# Patient Record
Sex: MALE | Race: WHITE | NOT HISPANIC OR LATINO | Employment: FULL TIME | URBAN - METROPOLITAN AREA
[De-identification: names, ages, dates, MRNs, and addresses within clinical notes are randomized per-mention and may not be internally consistent; named-entity substitution may affect disease eponyms.]

---

## 2017-08-15 ENCOUNTER — CLINICAL SUPPORT (OUTPATIENT)
Dept: SMOKING CESSATION | Facility: CLINIC | Age: 47
End: 2017-08-15
Payer: COMMERCIAL

## 2017-08-15 VITALS — WEIGHT: 196 LBS | HEIGHT: 73 IN | BODY MASS INDEX: 25.98 KG/M2

## 2017-08-15 DIAGNOSIS — F17.200 TOBACCO USE DISORDER: Primary | ICD-10-CM

## 2017-08-15 PROCEDURE — 99404 PREV MED CNSL INDIV APPRX 60: CPT | Mod: S$GLB,,,

## 2017-08-15 PROCEDURE — 99999 PR PBB SHADOW E&M-NEW PATIENT-LVL II: CPT | Mod: PBBFAC,,,

## 2017-08-15 RX ORDER — IBUPROFEN 200 MG
1 TABLET ORAL DAILY
Qty: 14 PATCH | Refills: 0 | Status: SHIPPED | OUTPATIENT
Start: 2017-08-15 | End: 2017-08-29

## 2017-08-15 RX ORDER — BUPROPION HYDROCHLORIDE 150 MG/1
150 TABLET, EXTENDED RELEASE ORAL 2 TIMES DAILY
Qty: 60 TABLET | Refills: 0 | Status: SHIPPED | OUTPATIENT
Start: 2017-08-15 | End: 2018-08-15

## 2017-09-05 ENCOUNTER — TELEPHONE (OUTPATIENT)
Dept: SMOKING CESSATION | Facility: CLINIC | Age: 47
End: 2017-09-05

## 2018-05-03 ENCOUNTER — CLINICAL SUPPORT (OUTPATIENT)
Dept: SMOKING CESSATION | Facility: CLINIC | Age: 48
End: 2018-05-03
Payer: COMMERCIAL

## 2018-05-03 ENCOUNTER — TELEPHONE (OUTPATIENT)
Dept: SMOKING CESSATION | Facility: CLINIC | Age: 48
End: 2018-05-03

## 2018-05-03 DIAGNOSIS — F17.200 NICOTINE DEPENDENCE: Primary | ICD-10-CM

## 2018-05-03 PROCEDURE — 99407 BEHAV CHNG SMOKING > 10 MIN: CPT | Mod: S$GLB,,,

## 2018-05-03 NOTE — PROGRESS NOTES
Pt not ready to return to the program at this time. States, he had a recent death in the family and will call back at a later date. Pt provided with his benefit status and contact information to schedule an appointment when ready

## 2018-07-20 ENCOUNTER — CLINICAL SUPPORT (OUTPATIENT)
Dept: SMOKING CESSATION | Facility: CLINIC | Age: 48
End: 2018-07-20
Payer: COMMERCIAL

## 2018-07-20 DIAGNOSIS — F17.200 NICOTINE DEPENDENCE: Primary | ICD-10-CM

## 2018-07-20 PROCEDURE — 99407 BEHAV CHNG SMOKING > 10 MIN: CPT | Mod: S$GLB,,,

## 2018-07-20 NOTE — PROGRESS NOTES
Successful contact with patient regarding tobacco cessation quit #1. Pt states, he was unable to become tobacco free, he continue to smoke occasionally, the prescribe tobacco cessation medication Wellbutrin was not helpful, and he will return to the program at a later date. Pt provided with his current benefit status and contact information to schedule an appointment when he is ready. Tobacco cessation smart form updated and the episode is resolved.

## 2021-03-30 ENCOUNTER — IMMUNIZATION (OUTPATIENT)
Dept: PRIMARY CARE CLINIC | Facility: CLINIC | Age: 51
End: 2021-03-30

## 2021-03-30 DIAGNOSIS — Z23 NEED FOR VACCINATION: Primary | ICD-10-CM

## 2021-03-30 PROCEDURE — 0001A COVID-19, MRNA, LNP-S, PF, 30 MCG/0.3 ML DOSE VACCINE: CPT | Mod: CV19,S$GLB,, | Performed by: INTERNAL MEDICINE

## 2021-03-30 PROCEDURE — 0001A COVID-19, MRNA, LNP-S, PF, 30 MCG/0.3 ML DOSE VACCINE: ICD-10-PCS | Mod: CV19,S$GLB,, | Performed by: INTERNAL MEDICINE

## 2021-03-30 PROCEDURE — 91300 COVID-19, MRNA, LNP-S, PF, 30 MCG/0.3 ML DOSE VACCINE: ICD-10-PCS | Mod: S$GLB,,, | Performed by: INTERNAL MEDICINE

## 2021-03-30 PROCEDURE — 91300 COVID-19, MRNA, LNP-S, PF, 30 MCG/0.3 ML DOSE VACCINE: CPT | Mod: S$GLB,,, | Performed by: INTERNAL MEDICINE

## 2021-04-20 ENCOUNTER — IMMUNIZATION (OUTPATIENT)
Dept: PRIMARY CARE CLINIC | Facility: CLINIC | Age: 51
End: 2021-04-20
Payer: COMMERCIAL

## 2021-04-20 DIAGNOSIS — Z23 NEED FOR VACCINATION: Primary | ICD-10-CM

## 2021-04-20 PROCEDURE — 91300 COVID-19, MRNA, LNP-S, PF, 30 MCG/0.3 ML DOSE VACCINE: ICD-10-PCS | Mod: S$GLB,,, | Performed by: INTERNAL MEDICINE

## 2021-04-20 PROCEDURE — 0002A COVID-19, MRNA, LNP-S, PF, 30 MCG/0.3 ML DOSE VACCINE: CPT | Mod: CV19,S$GLB,, | Performed by: INTERNAL MEDICINE

## 2021-04-20 PROCEDURE — 0002A COVID-19, MRNA, LNP-S, PF, 30 MCG/0.3 ML DOSE VACCINE: ICD-10-PCS | Mod: CV19,S$GLB,, | Performed by: INTERNAL MEDICINE

## 2021-04-20 PROCEDURE — 91300 COVID-19, MRNA, LNP-S, PF, 30 MCG/0.3 ML DOSE VACCINE: CPT | Mod: S$GLB,,, | Performed by: INTERNAL MEDICINE

## 2022-06-06 ENCOUNTER — NURSE TRIAGE (OUTPATIENT)
Dept: ADMINISTRATIVE | Facility: CLINIC | Age: 52
End: 2022-06-06
Payer: COMMERCIAL

## 2022-06-06 NOTE — TELEPHONE ENCOUNTER
Pt calling stating his symptoms started last Saturday and he tested positive for COVID last Monday so today is day 10 from symptoms. Asking if he needs to retest and when to end isolation. Advised per CDC and [facility]. verbalized understanding. Advised pt to call back with any other concerns or worsening symptoms. Verbalized understanding.    Reason for Disposition   COVID-19 Home Isolation, questions about   COVID-19 Testing, questions about    Protocols used: CORONAVIRUS (COVID-19) DIAGNOSED OR [id number]

## 2023-12-16 ENCOUNTER — HOSPITAL ENCOUNTER (EMERGENCY)
Facility: HOSPITAL | Age: 53
Discharge: HOME | End: 2023-12-16
Attending: EMERGENCY MEDICINE
Payer: COMMERCIAL

## 2023-12-16 VITALS
BODY MASS INDEX: 26.73 KG/M2 | DIASTOLIC BLOOD PRESSURE: 92 MMHG | SYSTOLIC BLOOD PRESSURE: 146 MMHG | HEART RATE: 87 BPM | HEIGHT: 75 IN | RESPIRATION RATE: 17 BRPM | OXYGEN SATURATION: 99 % | WEIGHT: 215 LBS | TEMPERATURE: 99 F

## 2023-12-16 DIAGNOSIS — K57.92 ACUTE DIVERTICULITIS OF INTESTINE: Primary | ICD-10-CM

## 2023-12-16 LAB
ALBUMIN SERPL BCP-MCNC: 4 G/DL (ref 3.5–5.2)
ALP SERPL-CCNC: 82 U/L (ref 55–135)
ALT SERPL W/O P-5'-P-CCNC: 12 U/L (ref 10–44)
ANION GAP SERPL CALC-SCNC: 12 MMOL/L (ref 8–16)
AST SERPL-CCNC: 23 U/L (ref 10–40)
BASOPHILS # BLD AUTO: 0.09 K/UL (ref 0–0.2)
BASOPHILS NFR BLD: 0.7 % (ref 0–1.9)
BILIRUB SERPL-MCNC: 0.4 MG/DL (ref 0.1–1)
BILIRUB UR QL STRIP: NEGATIVE
BUN SERPL-MCNC: 11 MG/DL (ref 6–20)
CALCIUM SERPL-MCNC: 9.1 MG/DL (ref 8.7–10.5)
CHLORIDE SERPL-SCNC: 100 MMOL/L (ref 95–110)
CLARITY UR: CLEAR
CO2 SERPL-SCNC: 23 MMOL/L (ref 23–29)
COLOR UR: YELLOW
CREAT SERPL-MCNC: 1 MG/DL (ref 0.5–1.4)
DIFFERENTIAL METHOD: ABNORMAL
EOSINOPHIL # BLD AUTO: 0.3 K/UL (ref 0–0.5)
EOSINOPHIL NFR BLD: 1.9 % (ref 0–8)
ERYTHROCYTE [DISTWIDTH] IN BLOOD BY AUTOMATED COUNT: 16.7 % (ref 11.5–14.5)
EST. GFR  (NO RACE VARIABLE): >60 ML/MIN/1.73 M^2
GLUCOSE SERPL-MCNC: 188 MG/DL (ref 70–110)
GLUCOSE UR QL STRIP: NEGATIVE
HCT VFR BLD AUTO: 44.3 % (ref 40–54)
HGB BLD-MCNC: 14.8 G/DL (ref 14–18)
HGB UR QL STRIP: NEGATIVE
IMM GRANULOCYTES # BLD AUTO: 0.05 K/UL (ref 0–0.04)
IMM GRANULOCYTES NFR BLD AUTO: 0.4 % (ref 0–0.5)
KETONES UR QL STRIP: ABNORMAL
LACTATE SERPL-SCNC: 0.8 MMOL/L (ref 0.5–2.2)
LEUKOCYTE ESTERASE UR QL STRIP: NEGATIVE
LIPASE SERPL-CCNC: 63 U/L (ref 4–60)
LYMPHOCYTES # BLD AUTO: 2.5 K/UL (ref 1–4.8)
LYMPHOCYTES NFR BLD: 18.1 % (ref 18–48)
MCH RBC QN AUTO: 27.1 PG (ref 27–31)
MCHC RBC AUTO-ENTMCNC: 33.4 G/DL (ref 32–36)
MCV RBC AUTO: 81 FL (ref 82–98)
MONOCYTES # BLD AUTO: 1.1 K/UL (ref 0.3–1)
MONOCYTES NFR BLD: 8 % (ref 4–15)
NEUTROPHILS # BLD AUTO: 9.6 K/UL (ref 1.8–7.7)
NEUTROPHILS NFR BLD: 70.9 % (ref 38–73)
NITRITE UR QL STRIP: NEGATIVE
NRBC BLD-RTO: 0 /100 WBC
PH UR STRIP: 7 [PH] (ref 5–8)
PLATELET # BLD AUTO: 211 K/UL (ref 150–450)
PMV BLD AUTO: 10.5 FL (ref 9.2–12.9)
POTASSIUM SERPL-SCNC: 3.6 MMOL/L (ref 3.5–5.1)
PROT SERPL-MCNC: 7.4 G/DL (ref 6–8.4)
PROT UR QL STRIP: NEGATIVE
RBC # BLD AUTO: 5.47 M/UL (ref 4.6–6.2)
SODIUM SERPL-SCNC: 135 MMOL/L (ref 136–145)
SP GR UR STRIP: 1.02 (ref 1–1.03)
URN SPEC COLLECT METH UR: ABNORMAL
UROBILINOGEN UR STRIP-ACNC: NEGATIVE EU/DL
WBC # BLD AUTO: 13.57 K/UL (ref 3.9–12.7)

## 2023-12-16 PROCEDURE — 63600175 PHARM REV CODE 636 W HCPCS: Performed by: EMERGENCY MEDICINE

## 2023-12-16 PROCEDURE — 99285 EMERGENCY DEPT VISIT HI MDM: CPT | Mod: 25

## 2023-12-16 PROCEDURE — 96365 THER/PROPH/DIAG IV INF INIT: CPT

## 2023-12-16 PROCEDURE — 85025 COMPLETE CBC W/AUTO DIFF WBC: CPT | Performed by: EMERGENCY MEDICINE

## 2023-12-16 PROCEDURE — 83605 ASSAY OF LACTIC ACID: CPT | Performed by: EMERGENCY MEDICINE

## 2023-12-16 PROCEDURE — 96361 HYDRATE IV INFUSION ADD-ON: CPT

## 2023-12-16 PROCEDURE — 80053 COMPREHEN METABOLIC PANEL: CPT | Performed by: EMERGENCY MEDICINE

## 2023-12-16 PROCEDURE — 25500020 PHARM REV CODE 255: Performed by: EMERGENCY MEDICINE

## 2023-12-16 PROCEDURE — 25000003 PHARM REV CODE 250: Performed by: EMERGENCY MEDICINE

## 2023-12-16 PROCEDURE — 81003 URINALYSIS AUTO W/O SCOPE: CPT | Performed by: EMERGENCY MEDICINE

## 2023-12-16 PROCEDURE — 96375 TX/PRO/DX INJ NEW DRUG ADDON: CPT

## 2023-12-16 PROCEDURE — 87040 BLOOD CULTURE FOR BACTERIA: CPT | Mod: 59 | Performed by: EMERGENCY MEDICINE

## 2023-12-16 PROCEDURE — 83690 ASSAY OF LIPASE: CPT | Performed by: EMERGENCY MEDICINE

## 2023-12-16 RX ORDER — ACETAMINOPHEN 500 MG
1000 TABLET ORAL EVERY 6 HOURS PRN
Qty: 20 TABLET | Refills: 0 | Status: SHIPPED | OUTPATIENT
Start: 2023-12-16

## 2023-12-16 RX ORDER — AMOXICILLIN AND CLAVULANATE POTASSIUM 875; 125 MG/1; MG/1
1 TABLET, FILM COATED ORAL 2 TIMES DAILY
Qty: 14 TABLET | Refills: 0 | Status: SHIPPED | OUTPATIENT
Start: 2023-12-16

## 2023-12-16 RX ORDER — ONDANSETRON 2 MG/ML
8 INJECTION INTRAMUSCULAR; INTRAVENOUS
Status: COMPLETED | OUTPATIENT
Start: 2023-12-16 | End: 2023-12-16

## 2023-12-16 RX ORDER — ONDANSETRON 4 MG/1
4 TABLET, FILM COATED ORAL EVERY 6 HOURS
Qty: 11 TABLET | Refills: 0 | Status: SHIPPED | OUTPATIENT
Start: 2023-12-16

## 2023-12-16 RX ORDER — KETOROLAC TROMETHAMINE 30 MG/ML
30 INJECTION, SOLUTION INTRAMUSCULAR; INTRAVENOUS
Status: COMPLETED | OUTPATIENT
Start: 2023-12-16 | End: 2023-12-16

## 2023-12-16 RX ORDER — IBUPROFEN 600 MG/1
600 TABLET ORAL EVERY 6 HOURS PRN
Qty: 20 TABLET | Refills: 0 | Status: SHIPPED | OUTPATIENT
Start: 2023-12-16

## 2023-12-16 RX ORDER — HYDROCODONE BITARTRATE AND ACETAMINOPHEN 5; 325 MG/1; MG/1
1 TABLET ORAL EVERY 4 HOURS PRN
Qty: 11 TABLET | Refills: 0 | Status: SHIPPED | OUTPATIENT
Start: 2023-12-16

## 2023-12-16 RX ADMIN — IOHEXOL 100 ML: 350 INJECTION, SOLUTION INTRAVENOUS at 08:12

## 2023-12-16 RX ADMIN — KETOROLAC TROMETHAMINE 30 MG: 30 INJECTION, SOLUTION INTRAMUSCULAR; INTRAVENOUS at 07:12

## 2023-12-16 RX ADMIN — SODIUM CHLORIDE, POTASSIUM CHLORIDE, SODIUM LACTATE AND CALCIUM CHLORIDE 2925 ML: 600; 310; 30; 20 INJECTION, SOLUTION INTRAVENOUS at 07:12

## 2023-12-16 RX ADMIN — ONDANSETRON 8 MG: 2 INJECTION INTRAMUSCULAR; INTRAVENOUS at 07:12

## 2023-12-16 RX ADMIN — PIPERACILLIN AND TAZOBACTAM 4.5 G: 4; .5 INJECTION, POWDER, LYOPHILIZED, FOR SOLUTION INTRAVENOUS; PARENTERAL at 08:12

## 2023-12-17 NOTE — ED PROVIDER NOTES
------------------------------------------------------------------------------------------------------------------  I, Tamica Vick, have reviewed the SORT/triage note.    History obtained from pt - a reliable and independent historian     History       Chief Complaint   Patient presents with    Abdominal Pain     Pt reports for sharp abdominal pain and tenderness to left abdomen. Pt reports hx of diverticulitis.         Nursing note reviewed and verified by me.  Details elaborated and clarified below.    HPI:   53 y.o. male PMHx of diverticulitis, who presents to the ED for evaluation of intermittent left sided abdominal pain beginning 4 days ago. Patient reports complaints of left sided abdominal pain, noting his pain will go from a 1-2/10 to a 9/10 at times. He endorses his complaints are similar to previous diverticulitis episodes. He additionally reports he receives weekly testosterone injections, noting he was seen on Wednesday to receive one and was told his blood pressure was elevated and he had a fever. He notes his pain was not as severe during that visit, and that it has worsened today. He states he had some leftover flagyl from 5 years ago (his last diverticulitis episode) and notes he took it today. No other medications taken PTA. No alleviating or exacerbating factors noted. Denies nausea, vomiting, melena, hematochezia, dysuria, hematuria, or other associated symptoms. He endorses he had [employer] (fried chicken) for lunch, as well as noting recent whiskey consumption. NKDA.         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    Review of patient's allergies indicates:  No Known Allergies    No current facility-administered medications on file prior to encounter.     Current Outpatient Medications on File Prior to Encounter   Medication Sig Dispense Refill    buPROPion (WELLBUTRIN SR) 150 MG TBSR 12 hr tablet Take 1 tablet (150 mg total) by mouth 2 (two) times daily. Take 1 tablet daily x 3 days, then 1 tablet twice daily 60 tablet 0       Review of Systems as per HPI  ROS  Constitutional: Negative for activity change, appetite change, fatigue, diaphoresis, and chills. Positive for fever.  Respiratory: Negative for apnea, choking, chest tightness and wheezing.  Genitourinary: Negative for hematuria, flank pain, frequency and dysuria.   Gastrointestinal: Negative for nausea, vomiting, hematochezia, or melena. Positive for abdominal pain.  Skin: Negative for color change, pallor, rash and wound.   Psychiatric/Behavioral: Negative for agitation, behavioral problems, confusion, decreased concentration and dysphoric mood.     All other systems reviewed and are negative.    PHYSICAL EXAMINATION: evaluated in ED room 15     ED Triage Vitals [12/16/23 1830]   Enc Vitals Group      BP (!) 169/95      Pulse 90      Resp 16      Temp 98.4 °F (36.9 °C)      Temp src       SpO2 98 %      Weight 215 lb      Height 6' 3"      Head Circumference       Peak Flow       Pain Score       Pain Loc       Pain Edu?       Excl. in GC?        PHYSICAL EXAMINATION:  Vital signs and nursing assessment noted: elevated    GEN:   NAD, A & Ox3, atraumatic, well appearing, nontoxic appearing  HEENT:  PERRLA, EOMI, moist membranes, nl conjunctiva, no scleral icterus, no nystagmus, no nodes/nodules, soft, supple, FROM, no trachial deviation  CV:   2+ radial pulses, <2sec cap refill, no obvious JVD  RESP:   No obvious wheezing or stridor, equal and bilateral chest rise, no respiratory distress  ABD:   soft, Nontender, " Nondistended, no guarding/rebound. Left flank tenderness to palpation.  :   Deferred  EXT:   FROM, ANDERSON x 4, no edema, no calf tenderness, no swelling, no bony tenderness, no warmth or redness, no crepitus, no obvious deformity  LYMPH:  no gross adenopathy  NEURO:  GCS 15, CN II-XII grossly intact, no obvious motor/sensory deficit, no tremor  PSYCH:   no SI/HI, no anxiety, nl mood/affect, nl judgement/thought process  SKIN:  Warm, dry, intact, no rashes/lesions or masses, nl color, no pallor     TESTS Ordered     Labs Reviewed   CBC W/ AUTO DIFFERENTIAL - Abnormal; Notable for the following components:       Result Value    WBC 13.57 (*)     MCV 81 (*)     RDW 16.7 (*)     Gran # (ANC) 9.6 (*)     Immature Grans (Abs) 0.05 (*)     Mono # 1.1 (*)     All other components within normal limits   COMPREHENSIVE METABOLIC PANEL - Abnormal; Notable for the following components:    Sodium 135 (*)     Glucose 188 (*)     All other components within normal limits   LIPASE - Abnormal; Notable for the following components:    Lipase 63 (*)     All other components within normal limits   URINALYSIS, REFLEX TO URINE CULTURE - Abnormal; Notable for the following components:    Ketones, UA Trace (*)     All other components within normal limits    Narrative:     Specimen Source->Urine   CULTURE, BLOOD   CULTURE, BLOOD   LACTIC ACID, PLASMA     CT Abdomen Pelvis With IV Contrast NO Oral Contrast   Final Result      1. Abnormal short to moderate length segment colonic wall thickening with surrounding inflammatory changes involving the descending colon in a region of numerous diverticula.  Findings may relate to acute diverticulitis versus short to moderate length segment of acute colitis.  No evidence of abscess or perforation.  Future colonoscopy follow-up is recommended to exclude potential underlying lesion.   2. Extensive colonic diverticulosis.         Electronically signed by: Parveen Almanza MD   Date:    12/16/2023    Time:    21:04          Procedures   Critical care was necessary to treat or prevent imminent or life-threatening deterioration.   Critical care time: 17 min  Time spent at the bedside, reviewing test results, discussing the case with staff and/or consult(s), documenting the medical record.    The time involved in the performance of separately reportable procedures was not counted toward critical care time    MEDICAL DECISION MAKING       History by independent historian and supplemented by Review of records from external source which were checked/reviewed in EMR: Reviewing PMHx    This is an emergent evaluation of a acute, new and undiagnosed problem for a 53 y.o. male with the following chronic conditions affecting care: diverticulitis, who presents to the ED for evaluation of intermittent left sided abdominal pain beginning 4 days ago. Exam shows left flank tenderness to palpation. I reviewed the Social determinant of health affecting care:  Body mass index is 26.87 kg/m². with concerns of obesity will consider access to nutritious food and physical activity opportunities.    Diagnosis or treatment significantly limited by social determinants of health.  Abdominal pain Diff Dx after consideration of threat to life or bodily function includes but not exclusive to: gallstones, cholecystitis, pancreatitis, hepatitis, PUD, obstruction, kidney stone, pyelonephritis, diverticulitis, malingering, chronic pain.    Treatment plan includes history, physical exam, cardiac monitoring, labs, imaging studies, and supportive care.       ED Course     MDM  Reviewed: previous chart, nursing note and vitals  Reviewed previous: labs and CT scan  Interpretation: labs and CT scan  Total time providing critical care: < 30 minutes. This excludes time spent performing separately reportable procedures and services.      Data to be reviewed, analyzed and independently interpreted in ED course below    ED Course as of 12/17/23 0212   Sat  Dec 16, 2023   1939 WBC(!): 13.57  elevated [NO]   1939 Immature Grans (Abs)(!): 0.05  elevated [NO]   2028 Lipase(!): 63  elevated [NO]   2028 Lactate, Kristopher: 0.8  unremarkable [NO]   2028 Glucose(!): 188  Mildly elevated otherwise relatively unremarkable CMP [NO]   2028 Awaiting CT scan of the abdomen/pelvis.  Patient is asking to take his home medications.  Will keep patient NPO until completion of CT scan.  Nurses hanging antibiotics at this time. [NO]   2055 CT Abdomen Pelvis With IV Contrast NO Oral Contrast  Inflammation noted along bowel on left side [NO]   2144 Ketones, UA(!): Trace  Mild ketonuria otherwise relatively unremarkable urinalysis [NO]   2145 The results of physical exam, lab and imaging findings were reviewed with the patient. This discussion included but not exclusive to the risk to the patient due to the potential underlying pathology, the testing that was required to make the diagnosis, and the treatment administered or prescribed.   Patient notified of incidental findings. We discussed abnormal imaging results and copy of the imaging studies was given to patient.  Patient advised that this will to be followed up as an outpatient including potential repeat colonscopy [NO]   2210 BP(!): 146/92  Improved from triage vitals [NO]   2221 Patient reports he took flagyl PTA. Zosyn ordered. Patient expressed concern that Cipro is not being administered to complement meds given with previous diagnosis.  We discussed using one medication of infusion (and later one pill as outpatient). Patient states ok to receive zosyn as ordered.           [NO]      ED Course User Index  [NO] Tamica Vick MD     REASSESSMENT: Patient is tolerating p.o. and ambulating with steady gait.   Sx improved after treatment with:   Medications   lactated ringers bolus 2,925 mL (0 mLs Intravenous Stopped 12/16/23 2210)   ondansetron injection 8 mg (8 mg Intravenous Given 12/16/23 1924)   ketorolac injection 30 mg (30 mg  Intravenous Given 12/16/23 1923)   piperacillin-tazobactam (ZOSYN) 4.5 g in dextrose 5 % in water (D5W) 100 mL IVPB (MB+) (0 g Intravenous Stopped 12/16/23 2100)   iohexoL (OMNIPAQUE 350) injection 100 mL (100 mLs Intravenous Given 12/16/23 2040)     After taking into careful account the historical factors and physical exam findings of the patient's presentation today, in conjunction with the empirical and objective data obtained on ED workup, no acute emergent medical condition requiring admission has been identified. We discussed the risk vs benefit of hospital admission.  The patient/family was offered further diagnostic workup/therapeutic interventions but declined at this time. Pt agrees with assessment, disposition and treatment plan.  As hospitalization is not indicated, patient is amenable to discharge.  The patient appears to be low risk for an emergent medical condition and I feel it is safe and appropriate at this time for the patient to be discharged to follow-up as detailed in their discharge instructions for reevaluation and possible continued outpatient workup and management. Regardless, an unremarkable evaluation in the ED does not preclude the development or presence of a serious or life threatening condition. As such, patient was instructed to return immediately for any worsening or change in current symptoms.  Precautions for return discussed at length.  Discharge and follow-up instructions discussed with the patient who expressed understanding and willingness to comply with my recommendations.        A printed After Visit Summary, relating to diagnosis, concerns, and/or associated differentials, was given to the patient. Opportunity given for all questions and concerns. All questions asked and answered to the satisfaction of the patient. Patient will follow up with    Follow-up Information       primary care physician. Call in 2 days.    Why: As needed, If symptoms worsen              gastroenterology In 1 week.    Why: for colonoscopy                        as an outpatient within 2 days for further evaluation.      Home and prescription medications reviewed.  PRESCRIPTION GIVEN:  Discharge Medication List as of 12/16/2023  9:51 PM        START taking these medications    Details   acetaminophen (TYLENOL) 500 MG tablet Take 2 tablets (1,000 mg total) by mouth every 6 (six) hours as needed for Pain., Starting Sat 12/16/2023, Print      amoxicillin-clavulanate 875-125mg (AUGMENTIN) 875-125 mg per tablet Take 1 tablet by mouth 2 (two) times daily., Starting Sat 12/16/2023, Print      HYDROcodone-acetaminophen (NORCO) 5-325 mg per tablet Take 1 tablet by mouth every 4 (four) hours as needed for Pain., Starting Sat 12/16/2023, Print      ibuprofen (ADVIL,MOTRIN) 600 MG tablet Take 1 tablet (600 mg total) by mouth every 6 (six) hours as needed for Pain or Temperature greater than (38.3)., Starting Sat 12/16/2023, Print      ondansetron (ZOFRAN) 4 MG tablet Take 1 tablet (4 mg total) by mouth every 6 (six) hours., Starting Sat 12/16/2023, Print           Discharge Medication List as of 12/16/2023  9:51 PM          CLINICAL IMPRESSION     1. Acute diverticulitis of intestine         ED Disposition Condition    Discharge Stable            SCRIBE #1 NOTE: I, Sreekanth Bolden, am scribing for, and in the presence of,  Tamica Vick MD.         Scribe Attestation:   Scribe #1: I performed the above scribed service and the documentation accurately describes the services I performed. I attest to the accuracy of the note.        I, Dr. Tamica Vick, personally performed the services described in this documentation. All medical record entries made by the scribe were at my direction and in my presence.  I have reviewed the chart and agree that the record reflects my personal performance and is accurate and complete. Tamica Vick MD.  7:03 PM 12/17/2023    Disclaimer: This document was created  using voice recognition software (M*Modal Fluency Direct). Although it may be edited, this document may contain errors related to incorrect recognition of the spoken word. Please call the physician if clarification is needed.          Tamica Vick MD  12/17/23 2400

## 2023-12-17 NOTE — DISCHARGE INSTRUCTIONS
CT abd/pelvis Impression:     1. Abnormal short to moderate length segment colonic wall thickening with surrounding inflammatory changes involving the descending colon in a region of numerous diverticula.  Findings may relate to acute diverticulitis versus short to moderate length segment of acute colitis.  No evidence of abscess or perforation.  Future colonoscopy follow-up is recommended to exclude potential underlying lesion.  2. Extensive colonic diverticulosis.

## 2023-12-17 NOTE — ED NOTES
"Patient presents to ED reports left sided abd pain. Patient reports that pain "comes in waves" and patient associates pain with previous diverticulitis dx. Patient reports taking  Flagyl today for symptoms. Patient denies urinary symptoms, denies n/v/d.   "

## 2023-12-17 NOTE — ED NOTES
Patient expresses concern that home medications have not been taken for the night dose. Patient concerns brought to MDs attention. Advised patient to not take medications at this time per MD's order. Patient verbalized understanding.

## 2023-12-17 NOTE — ED NOTES
Patient expressed concern that Cipro is not being administered. Concerns brought to MDs attention. Continue administration of zosyn. Patient states ok to receive zosyn as ordered.

## 2023-12-20 LAB
BACTERIA BLD CULT: NORMAL
BACTERIA BLD CULT: NORMAL